# Patient Record
Sex: FEMALE | Race: ASIAN | Employment: FULL TIME | URBAN - METROPOLITAN AREA
[De-identification: names, ages, dates, MRNs, and addresses within clinical notes are randomized per-mention and may not be internally consistent; named-entity substitution may affect disease eponyms.]

---

## 2019-07-26 ENCOUNTER — APPOINTMENT (OUTPATIENT)
Dept: GENERAL RADIOLOGY | Facility: HOSPITAL | Age: 49
End: 2019-07-26
Attending: PEDIATRICS
Payer: COMMERCIAL

## 2019-07-26 ENCOUNTER — HOSPITAL ENCOUNTER (EMERGENCY)
Facility: HOSPITAL | Age: 49
Discharge: HOME | End: 2019-07-26
Attending: PEDIATRICS
Payer: COMMERCIAL

## 2019-07-26 VITALS
HEIGHT: 62 IN | DIASTOLIC BLOOD PRESSURE: 88 MMHG | RESPIRATION RATE: 16 BRPM | SYSTOLIC BLOOD PRESSURE: 168 MMHG | HEART RATE: 70 BPM | TEMPERATURE: 98 F | WEIGHT: 128 LBS | OXYGEN SATURATION: 100 % | BODY MASS INDEX: 23.55 KG/M2

## 2019-07-26 DIAGNOSIS — S16.1XXA NECK STRAIN, INITIAL ENCOUNTER: ICD-10-CM
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
## 2019-07-26 DIAGNOSIS — S90.111A CONTUSION OF RIGHT GREAT TOE WITHOUT DAMAGE TO NAIL, INITIAL ENCOUNTER: ICD-10-CM

## 2019-07-26 LAB
POCT LOT NUMBER: NORMAL
POCT URINE PREGNANCY: NEGATIVE

## 2019-07-26 PROCEDURE — 81025 URINE PREGNANCY TEST: CPT

## 2019-07-26 PROCEDURE — 99284 EMERGENCY DEPT VISIT MOD MDM: CPT

## 2019-07-26 PROCEDURE — 72040 X-RAY EXAM NECK SPINE 2-3 VW: CPT | Performed by: PEDIATRICS

## 2019-07-26 PROCEDURE — 73660 X-RAY EXAM OF TOE(S): CPT | Performed by: PEDIATRICS

## 2019-07-26 PROCEDURE — 73030 X-RAY EXAM OF SHOULDER: CPT | Performed by: PEDIATRICS

## 2019-07-26 RX ORDER — HYDROCHLOROTHIAZIDE 25 MG/1
25 TABLET ORAL DAILY
COMMUNITY

## 2019-07-26 RX ORDER — AMLODIPINE BESYLATE 5 MG/1
5 TABLET ORAL DAILY
COMMUNITY

## 2019-07-26 RX ORDER — IBUPROFEN 600 MG/1
600 TABLET ORAL ONCE
Status: COMPLETED | OUTPATIENT
Start: 2019-07-26 | End: 2019-07-26
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
## (undated) NOTE — ED AVS SNAPSHOT
Karina Wilkerson   MRN: [id number]    Department:  [facility] Emergency Department   Date of Visit:  7/26/2019           Disclosure     Insurance plans vary and the physician(s) referred by the ER may not be covered by your plan.  Please contact your tell this physician (or your personal doctor if your instructions are to return to your personal doctor) about any new or lasting problems. The primary care or specialist physician will see patients referred from the [facility] Emergency Department.  Tha Antoine